# Patient Record
Sex: FEMALE | Race: WHITE | NOT HISPANIC OR LATINO | Employment: UNEMPLOYED | URBAN - NONMETROPOLITAN AREA
[De-identification: names, ages, dates, MRNs, and addresses within clinical notes are randomized per-mention and may not be internally consistent; named-entity substitution may affect disease eponyms.]

---

## 2017-03-13 ENCOUNTER — OFFICE VISIT (OUTPATIENT)
Dept: INTERNAL MEDICINE | Facility: CLINIC | Age: 27
End: 2017-03-13

## 2017-03-13 VITALS
BODY MASS INDEX: 22.15 KG/M2 | DIASTOLIC BLOOD PRESSURE: 64 MMHG | OXYGEN SATURATION: 99 % | HEART RATE: 82 BPM | TEMPERATURE: 98.1 F | SYSTOLIC BLOOD PRESSURE: 118 MMHG | WEIGHT: 125 LBS | HEIGHT: 63 IN

## 2017-03-13 DIAGNOSIS — R10.9 ABDOMINAL PAIN, UNSPECIFIED LOCATION: Primary | ICD-10-CM

## 2017-03-13 DIAGNOSIS — R10.2 PELVIC PAIN IN FEMALE: ICD-10-CM

## 2017-03-13 DIAGNOSIS — N94.10 DYSPAREUNIA IN FEMALE: ICD-10-CM

## 2017-03-13 LAB
BILIRUB BLD-MCNC: NEGATIVE MG/DL
CLARITY, POC: CLEAR
COLOR UR: YELLOW
GLUCOSE UR STRIP-MCNC: NEGATIVE MG/DL
KETONES UR QL: NEGATIVE
LEUKOCYTE EST, POC: NEGATIVE
NITRITE UR-MCNC: NEGATIVE MG/ML
PH UR: 7 [PH] (ref 5–8)
PROT UR STRIP-MCNC: NEGATIVE MG/DL
RBC # UR STRIP: NEGATIVE /UL
SP GR UR: 1.01 (ref 1–1.03)
UROBILINOGEN UR QL: NORMAL

## 2017-03-13 PROCEDURE — 81003 URINALYSIS AUTO W/O SCOPE: CPT | Performed by: PHYSICIAN ASSISTANT

## 2017-03-13 PROCEDURE — 99213 OFFICE O/P EST LOW 20 MIN: CPT | Performed by: PHYSICIAN ASSISTANT

## 2017-03-13 NOTE — PROGRESS NOTES
Verenice Toney is a 27 y.o. female.     Subjective   History of Present Illness   Here today with concern of right lower pelvic ache which has become more frequent since onset about 1 month ago. 6 months postpartum and has not restarted menses since then due to breast feeding. Pain is sharp with intercourse with increased soreness for about 1 day. Denies bleeding following intercourse. Denies any abnormal discharge or urinary symptoms.        The following portions of the patient's history were reviewed and updated as appropriate: allergies, current medications, past family history, past medical history, past social history, past surgical history and problem list.    Review of Systems    Constitutional: Negative for appetite change, chills, fatigue, fever and unexpected weight change.   HENT: Negative for congestion, ear pain, hearing loss, nosebleeds, postnasal drip, rhinorrhea, sore throat, tinnitus and trouble swallowing.    Eyes: Negative for photophobia, discharge and visual disturbance.   Respiratory: Negative for cough, chest tightness, shortness of breath and wheezing.    Cardiovascular: Negative for chest pain, palpitations and leg swelling.   Gastrointestinal: Negative for abdominal distention, abdominal pain, blood in stool, constipation, diarrhea, nausea and vomiting.   Endocrine: Negative for cold intolerance, heat intolerance, polydipsia, polyphagia and polyuria.   Musculoskeletal: Negative for arthralgias, back pain, joint swelling, myalgias, neck pain and neck stiffness.   Skin: Negative for color change, pallor, rash and wound.   Allergic/Immunologic: Negative for environmental allergies, food allergies and immunocompromised state.   Neurological: Negative for dizziness, tremors, seizures, weakness, numbness and headaches.   Hematological: Negative for adenopathy. Does not bruise/bleed easily.   Psychiatric/Behavioral: Negative for sleep disturbances, agitation, behavioral problems, confusion,  "hallucinations, self-injury and suicidal ideas. The patient is not nervous/anxious.    . Dyspareunia, right lower pelvic ache.     Objective    Physical Exam  Constitutional: Oriented to person, place, and time. Appears well-developed and well-nourished.   HENT:   Head: Normocephalic and atraumatic.   Eyes: EOM are normal. Pupils are equal, round, and reactive to light.   Neck: Normal range of motion. Neck supple.   Cardiovascular: Normal rate, regular rhythm and normal heart sounds.    Pulmonary/Chest: Effort normal and breath sounds normal. No respiratory distress.  Has no wheezes or rales. Exhibits no chest wall tenderness.   Abdominal: Soft. Bowel sounds are normal. Exhibits no distension and no mass. There is no tenderness.   Musculoskeletal: Normal range of motion. Exhibits no tenderness.   Neurological: Alert and oriented to person, place, and time.   Skin: Skin is warm and dry.   Psychiatric: Has a normal mood and affect. Behavior is normal. Judgment and thought content normal.       Visit Vitals   • /64   • Pulse 82   • Temp 98.1 °F (36.7 °C)   • Ht 63\" (160 cm)   • Wt 125 lb (56.7 kg)   • SpO2 99%   • BMI 22.14 kg/m2       Nursing note and vitals reviewed.          Assessment/Plan   Verenice was seen today for abdominal pain.    Diagnoses and all orders for this visit:    Abdominal pain, unspecified location  -     POCT urinalysis dipstick, automated    Pelvic pain in female  -     US Non-ob Transvaginal    Dyspareunia in female  -     US Non-ob Transvaginal      UA negative. No chance of pregnancy.          "

## 2017-03-14 ENCOUNTER — HOSPITAL ENCOUNTER (OUTPATIENT)
Dept: ULTRASOUND IMAGING | Facility: HOSPITAL | Age: 27
Discharge: HOME | End: 2017-03-14
Admitting: PHYSICIAN ASSISTANT

## 2017-03-14 DIAGNOSIS — R10.2 PELVIC PAIN IN FEMALE: ICD-10-CM

## 2017-03-14 DIAGNOSIS — N94.10 DYSPAREUNIA IN FEMALE: ICD-10-CM

## 2017-03-14 PROCEDURE — 93976 VASCULAR STUDY: CPT

## 2017-03-14 PROCEDURE — 76830 TRANSVAGINAL US NON-OB: CPT

## 2017-03-30 ENCOUNTER — TELEPHONE (OUTPATIENT)
Dept: INTERNAL MEDICINE | Facility: CLINIC | Age: 27
End: 2017-03-30

## 2017-03-30 NOTE — TELEPHONE ENCOUNTER
----- Message from Lorenzo Liao sent at 3/30/2017  1:02 PM EDT -----  Contact: Patient  Patient is requesting a callback. She wasn't specific about what it was regarding.

## 2020-07-09 ENCOUNTER — TELEMEDICINE (OUTPATIENT)
Dept: INTERNAL MEDICINE | Facility: CLINIC | Age: 30
End: 2020-07-09

## 2020-07-09 DIAGNOSIS — R11.2 NAUSEA VOMITING AND DIARRHEA: Primary | ICD-10-CM

## 2020-07-09 DIAGNOSIS — K21.9 GASTROESOPHAGEAL REFLUX DISEASE, ESOPHAGITIS PRESENCE NOT SPECIFIED: ICD-10-CM

## 2020-07-09 DIAGNOSIS — R10.13 EPIGASTRIC PAIN: ICD-10-CM

## 2020-07-09 DIAGNOSIS — R19.7 NAUSEA VOMITING AND DIARRHEA: Primary | ICD-10-CM

## 2020-07-09 DIAGNOSIS — F32.A DEPRESSION, UNSPECIFIED DEPRESSION TYPE: ICD-10-CM

## 2020-07-09 PROCEDURE — 99203 OFFICE O/P NEW LOW 30 MIN: CPT | Performed by: FAMILY MEDICINE

## 2020-07-09 RX ORDER — ONDANSETRON 8 MG/1
8 TABLET, ORALLY DISINTEGRATING ORAL EVERY 8 HOURS PRN
Qty: 20 TABLET | Refills: 0 | OUTPATIENT
Start: 2020-07-09 | End: 2021-06-26

## 2020-07-09 RX ORDER — SERTRALINE HYDROCHLORIDE 100 MG/1
TABLET, FILM COATED ORAL
COMMUNITY
Start: 2020-06-29 | End: 2020-07-09 | Stop reason: SDUPTHER

## 2020-07-09 RX ORDER — SERTRALINE HYDROCHLORIDE 100 MG/1
100 TABLET, FILM COATED ORAL DAILY
Qty: 90 TABLET | Refills: 3 | Status: SHIPPED | OUTPATIENT
Start: 2020-07-09 | End: 2021-07-28

## 2020-07-09 NOTE — PROGRESS NOTES
You have chosen to receive care through a telehealth visit.  Do you consent to use a video/audio connection for your medical care today? Yes    On this video are Verenice Toney and Albertina Hollingsworth DO

## 2020-07-09 NOTE — PROGRESS NOTES
Verenice Toney is a 30 y.o. female.    Chief Complaint   Patient presents with   • Nausea   • Vomiting     During today's visit, I reviewed the documented allergies, medications, chief complaint, and pertinent vitals.  I have confirmed with the patient that there have been no changes since this information was discussed with my clinical team member.    Unable to connect with the patient via video.  Patient was changed over to telephone encounter, due to technical difficulty.    HPI   Patient is new to me, but has been a long-term patient at this clinic.  She has not been seen in over 3 years.  Patient reports nausea for the past 6-7 weeks.  She confirmed she is not pregnant.  She reports consistent epigastric pain that lasted hours.  She reports pain was severe a few nights ago.  She reports she did vomit once.  She reports chills at the time, but no fever, that came on all of sudden.  She has had heartburn and reflux over the the last few days.  She has had diarrhea.  She did take prilosec last night and did seem to get some relief and hasn't had the pain since.      Patient is being treated for depression with Zoloft as well.  This was previously prescribed by her gynecologist.  However, they have closed.  She reports that she is doing well on the medication.  Denies any feelings of hopelessness or worthlessness.  She does need a refill today.    The following portions of the patient's history were reviewed and updated as appropriate: allergies, current medications, past family history, past medical history, past social history, past surgical history and problem list.     Past Medical History:   Diagnosis Date   • Depression        History reviewed. No pertinent surgical history.    History reviewed. No pertinent family history.    Social History     Socioeconomic History   • Marital status:      Spouse name: Not on file   • Number of children: Not on file   • Years of education: Not on file   • Highest  education level: Not on file   Tobacco Use   • Smoking status: Never Smoker   • Smokeless tobacco: Never Used   Substance and Sexual Activity   • Alcohol use: Yes     Comment: rare beer   • Drug use: No   • Sexual activity: Yes     Partners: Male       Allergies   Allergen Reactions   • Sulfa Antibiotics          Current Outpatient Medications:   •  sertraline (ZOLOFT) 100 MG tablet, Take 1 tablet by mouth Daily., Disp: 90 tablet, Rfl: 3  •  ondansetron ODT (ZOFRAN-ODT) 8 MG disintegrating tablet, Place 1 tablet on the tongue Every 8 (Eight) Hours As Needed for Nausea or Vomiting., Disp: 20 tablet, Rfl: 0    ROS    Review of Systems   Constitutional: Positive for chills and fatigue. Negative for fever.   HENT: Negative for congestion.    Respiratory: Negative for choking and shortness of breath.    Cardiovascular: Negative for chest pain.   Gastrointestinal: Positive for abdominal pain, diarrhea, nausea and GERD. Negative for constipation.       There were no vitals filed for this visit.  There is no height or weight on file to calculate BMI.    Physical Exam     Physical Exam    Assessment/Plan    Problem List Items Addressed This Visit     None      Visit Diagnoses     Nausea vomiting and diarrhea    -  Primary    Relevant Orders    US Gallbladder    Epigastric pain        Relevant Orders    US Gallbladder    Gastroesophageal reflux disease, esophagitis presence not specified        Depression, unspecified depression type        Relevant Medications    sertraline (ZOLOFT) 100 MG tablet        Patient may continue Zoloft for depression.  Depression appears stable.    There is concern that the patient has gallbladder disease.  Will obtain an ultrasound for further evaluation.  Patient advised that she may continue taking Prilosec.  She may take Pepcid as needed for intermittent reflux and heartburn as well.  Zofran has been sent to the patient's pharmacy to take as needed for nausea.  Discussed with patient if  ultrasound is negative, will proceed with HIDA scan.    New Medications Ordered This Visit   Medications   • sertraline (ZOLOFT) 100 MG tablet     Sig: Take 1 tablet by mouth Daily.     Dispense:  90 tablet     Refill:  3   • ondansetron ODT (ZOFRAN-ODT) 8 MG disintegrating tablet     Sig: Place 1 tablet on the tongue Every 8 (Eight) Hours As Needed for Nausea or Vomiting.     Dispense:  20 tablet     Refill:  0       No orders of the defined types were placed in this encounter.      No follow-ups on file.      Albertina Hollingsworth DO

## 2020-07-10 ENCOUNTER — HOSPITAL ENCOUNTER (OUTPATIENT)
Dept: ULTRASOUND IMAGING | Facility: HOSPITAL | Age: 30
Discharge: HOME | End: 2020-07-10
Admitting: FAMILY MEDICINE

## 2020-07-10 PROCEDURE — 76705 ECHO EXAM OF ABDOMEN: CPT

## 2020-07-14 ENCOUNTER — TELEPHONE (OUTPATIENT)
Dept: INTERNAL MEDICINE | Facility: CLINIC | Age: 30
End: 2020-07-14

## 2020-07-14 DIAGNOSIS — R10.13 EPIGASTRIC PAIN: ICD-10-CM

## 2020-07-14 DIAGNOSIS — K21.9 GASTROESOPHAGEAL REFLUX DISEASE, ESOPHAGITIS PRESENCE NOT SPECIFIED: ICD-10-CM

## 2020-07-14 DIAGNOSIS — R11.2 NAUSEA VOMITING AND DIARRHEA: Primary | ICD-10-CM

## 2020-07-14 DIAGNOSIS — R19.7 NAUSEA VOMITING AND DIARRHEA: Primary | ICD-10-CM

## 2020-07-14 NOTE — TELEPHONE ENCOUNTER
Looks like this isn't a Dr. Avitia patient anymore. Just forwarding this to you cause she has seen Edel. Please advise.

## 2020-07-14 NOTE — TELEPHONE ENCOUNTER
Patient returned your phone call and states you can call her back when you get a chance.  Phone number verified.

## 2020-07-21 ENCOUNTER — APPOINTMENT (OUTPATIENT)
Dept: NUCLEAR MEDICINE | Facility: HOSPITAL | Age: 30
End: 2020-07-21

## 2020-07-24 ENCOUNTER — APPOINTMENT (OUTPATIENT)
Dept: NUCLEAR MEDICINE | Facility: HOSPITAL | Age: 30
End: 2020-07-24

## 2020-07-31 ENCOUNTER — APPOINTMENT (OUTPATIENT)
Dept: NUCLEAR MEDICINE | Facility: HOSPITAL | Age: 30
End: 2020-07-31

## 2021-07-28 RX ORDER — SERTRALINE HYDROCHLORIDE 100 MG/1
TABLET, FILM COATED ORAL
Qty: 30 TABLET | Refills: 0 | Status: SHIPPED | OUTPATIENT
Start: 2021-07-28 | End: 2021-09-10 | Stop reason: DRUGHIGH

## 2021-08-20 ENCOUNTER — TELEPHONE (OUTPATIENT)
Dept: INTERNAL MEDICINE | Facility: CLINIC | Age: 31
End: 2021-08-20

## 2021-08-20 NOTE — TELEPHONE ENCOUNTER
Patient did not completeNM HIDA ordered on 07/14/2020. This order is too old to be used. May I cancel the order?

## 2021-09-10 ENCOUNTER — OFFICE VISIT (OUTPATIENT)
Dept: INTERNAL MEDICINE | Facility: CLINIC | Age: 31
End: 2021-09-10

## 2021-09-10 VITALS
HEART RATE: 58 BPM | RESPIRATION RATE: 20 BRPM | TEMPERATURE: 97.7 F | BODY MASS INDEX: 23.78 KG/M2 | WEIGHT: 134.2 LBS | DIASTOLIC BLOOD PRESSURE: 64 MMHG | SYSTOLIC BLOOD PRESSURE: 100 MMHG | HEIGHT: 63 IN | OXYGEN SATURATION: 98 %

## 2021-09-10 DIAGNOSIS — F32.A ANXIETY AND DEPRESSION: Primary | ICD-10-CM

## 2021-09-10 DIAGNOSIS — F41.9 ANXIETY AND DEPRESSION: Primary | ICD-10-CM

## 2021-09-10 DIAGNOSIS — R21 RASH: ICD-10-CM

## 2021-09-10 PROCEDURE — 99214 OFFICE O/P EST MOD 30 MIN: CPT | Performed by: FAMILY MEDICINE

## 2021-09-10 RX ORDER — HYDROXYZINE HYDROCHLORIDE 25 MG/1
25 TABLET, FILM COATED ORAL 3 TIMES DAILY PRN
Qty: 90 TABLET | Refills: 0 | Status: SHIPPED | OUTPATIENT
Start: 2021-09-10

## 2021-09-10 RX ORDER — SERTRALINE HYDROCHLORIDE 100 MG/1
150 TABLET, FILM COATED ORAL DAILY
Qty: 135 TABLET | Refills: 1 | Status: SHIPPED | OUTPATIENT
Start: 2021-09-10 | End: 2022-04-07

## 2021-09-10 RX ORDER — SERTRALINE HYDROCHLORIDE 100 MG/1
150 TABLET, FILM COATED ORAL DAILY
Qty: 135 TABLET | Refills: 1 | Status: SHIPPED | OUTPATIENT
Start: 2021-09-10 | End: 2021-09-10 | Stop reason: SDUPTHER

## 2021-09-10 NOTE — PROGRESS NOTES
"Verenice Toney is a 31 y.o. female.    Chief Complaint   Patient presents with   • Depression       HPI   Patient presents today to follow up on anxiety.  She is taking zoloft and has done so for years.  Does not feel that is has been effective here lately.  She does admit to new life stressors that may be contributing such as moving to a new city, looking for a house,  changing jobs, etc.  She admits to crying spells.  Admit to feeling of sadness, hopelessness, worthlessness. Admits to occasional jitteriness, nervousness and worry.    Patient reports left leg rash that comes and goes.  She has applied aquafor and eucerin cream without any response.  Resolves on its own and them comes back.  Denies any itching or pain.     The following portions of the patient's history were reviewed and updated as appropriate: allergies, current medications, past family history, past medical history, past social history, past surgical history and problem list.     Allergies   Allergen Reactions   • Sulfa Antibiotics          Current Outpatient Medications:   •  hydrOXYzine (ATARAX) 25 MG tablet, Take 1 tablet by mouth 3 (Three) Times a Day As Needed for Anxiety., Disp: 90 tablet, Rfl: 0  •  sertraline (Zoloft) 100 MG tablet, Take 1.5 tablets by mouth Daily for 90 days., Disp: 135 tablet, Rfl: 1    ROS    Review of Systems   Constitutional: Positive for fatigue. Negative for chills and fever.   Respiratory: Positive for shortness of breath. Negative for cough.    Cardiovascular: Positive for chest pain.   Skin: Positive for rash.   Psychiatric/Behavioral: Positive for depressed mood. The patient is nervous/anxious.        Vitals:    09/10/21 1112   BP: 100/64   Pulse: 58   Resp: 20   Temp: 97.7 °F (36.5 °C)   TempSrc: Temporal   SpO2: 98%   Weight: 60.9 kg (134 lb 3.2 oz)   Height: 160 cm (63\")     Body mass index is 23.77 kg/m².    Physical Exam     Physical Exam  Constitutional:       General: She is not in acute " distress.     Appearance: Normal appearance. She is well-developed.   HENT:      Head: Normocephalic and atraumatic.      Right Ear: External ear normal.      Left Ear: External ear normal.   Eyes:      Conjunctiva/sclera: Conjunctivae normal.   Cardiovascular:      Rate and Rhythm: Normal rate and regular rhythm.      Heart sounds: No murmur heard.     Pulmonary:      Effort: Pulmonary effort is normal. No respiratory distress.      Breath sounds: Normal breath sounds. No wheezing.   Abdominal:      General: Bowel sounds are normal. There is no distension.      Palpations: Abdomen is soft.      Tenderness: There is no abdominal tenderness.   Skin:     General: Skin is warm and dry.      Findings: Rash (scattered, raised clusters of flesh colored lesions to left popliteal region and left posterior thigh.) present.   Neurological:      Mental Status: She is alert and oriented to person, place, and time.      Cranial Nerves: No cranial nerve deficit.   Psychiatric:         Mood and Affect: Mood normal.         Behavior: Behavior normal.         Assessment/Plan    Problems Addressed this Visit        Mental Health    Anxiety and depression - Primary     Uncontrolled on current dosage of zoloft.  Will increase to 150mg daily.  May take hydroxyzine as needed for increased anxiety.  Discussed potential side effects of the medication today.          Relevant Medications    sertraline (Zoloft) 100 MG tablet    hydrOXYzine (ATARAX) 25 MG tablet       Skin    Rash     Encouraged to try cortisone twice a day OTC.  If no improvement will refer to dermatology.              New Medications Ordered This Visit   Medications   • sertraline (Zoloft) 100 MG tablet     Sig: Take 1.5 tablets by mouth Daily for 90 days.     Dispense:  135 tablet     Refill:  1   • hydrOXYzine (ATARAX) 25 MG tablet     Sig: Take 1 tablet by mouth 3 (Three) Times a Day As Needed for Anxiety.     Dispense:  90 tablet     Refill:  0       No orders of the  defined types were placed in this encounter.      Return in about 1 month (around 10/10/2021) for anxiety and depression.    Albertina Hollingsworth, DO

## 2021-09-11 NOTE — ASSESSMENT & PLAN NOTE
Uncontrolled on current dosage of zoloft.  Will increase to 150mg daily.  May take hydroxyzine as needed for increased anxiety.  Discussed potential side effects of the medication today.

## 2021-09-22 RX ORDER — SERTRALINE HYDROCHLORIDE 100 MG/1
TABLET, FILM COATED ORAL
Qty: 30 TABLET | OUTPATIENT
Start: 2021-09-22

## 2022-04-07 RX ORDER — SERTRALINE HYDROCHLORIDE 100 MG/1
TABLET, FILM COATED ORAL
Qty: 135 TABLET | Refills: 1 | Status: SHIPPED | OUTPATIENT
Start: 2022-04-07

## 2022-04-07 NOTE — TELEPHONE ENCOUNTER
Rx Refill Note  Requested Prescriptions     Pending Prescriptions Disp Refills   • sertraline (ZOLOFT) 100 MG tablet [Pharmacy Med Name: SERTRALINE 100MG TABLETS] 135 tablet 1     Sig: TAKE 1 AND 1/2 TABLETS BY MOUTH DAILY      Last office visit with prescribing clinician: 9/10/2021      Next office visit with prescribing clinician: Visit date not found            EDWARDO BELL MA  04/07/22, 08:51 EDT